# Patient Record
Sex: MALE | Race: ASIAN | NOT HISPANIC OR LATINO | URBAN - METROPOLITAN AREA
[De-identification: names, ages, dates, MRNs, and addresses within clinical notes are randomized per-mention and may not be internally consistent; named-entity substitution may affect disease eponyms.]

---

## 2021-05-25 ENCOUNTER — RECORDS - HEALTHEAST (OUTPATIENT)
Dept: ADMINISTRATIVE | Facility: CLINIC | Age: 21
End: 2021-05-25

## 2021-07-05 ENCOUNTER — COMMUNICATION - HEALTHEAST (OUTPATIENT)
Dept: FAMILY MEDICINE | Facility: CLINIC | Age: 21
End: 2021-07-05

## 2021-07-22 NOTE — LETTER
Letter by Noa Garcia DO at      Author: Noa Garcia DO Service: -- Author Type: --    Filed:  Encounter Date: 2021 Status: (Other)         Stephy PINEDO Nell  [address]  [place] [address]      2021      Dear Stephy Camejo,   : 2000      This letter is in regards to the appointment that you had scheduled on 2021 at the [facility] with Dr. Garcia.     The [facility] strives to see all patients in a timely manner and we need your help to achieve this.  The above-mentioned appointment was missed and we do not have record of a cancellation by you.  Whenever possible, we request appointment cancellations at least 72 hours in advance.  This time allows us to offer the appointment to another patient in need.      If you feel you have received this letter in error, or if you need to reschedule this appointment, please call our office so that we may update our records.      Sincerely,    [facility]

## 2025-02-23 ENCOUNTER — OFFICE VISIT (OUTPATIENT)
Dept: URGENT CARE | Facility: URGENT CARE | Age: 25
End: 2025-02-23

## 2025-02-23 VITALS
SYSTOLIC BLOOD PRESSURE: 127 MMHG | OXYGEN SATURATION: 97 % | HEART RATE: 67 BPM | WEIGHT: 180 LBS | TEMPERATURE: 98.2 F | RESPIRATION RATE: 16 BRPM | DIASTOLIC BLOOD PRESSURE: 87 MMHG

## 2025-02-23 DIAGNOSIS — M10.9 ACUTE GOUT INVOLVING TOE OF RIGHT FOOT, UNSPECIFIED CAUSE: Primary | ICD-10-CM

## 2025-02-23 PROCEDURE — 99203 OFFICE O/P NEW LOW 30 MIN: CPT | Performed by: FAMILY MEDICINE

## 2025-02-23 RX ORDER — PREDNISONE 20 MG/1
TABLET ORAL
Qty: 20 TABLET | Refills: 0 | Status: SHIPPED | OUTPATIENT
Start: 2025-02-23

## 2025-02-23 ASSESSMENT — PAIN SCALES - GENERAL: PAINLEVEL_OUTOF10: MODERATE PAIN (6)

## 2025-02-23 NOTE — PROGRESS NOTES
Assessment:       Acute gout involving toe of right foot, unspecified cause  - predniSONE (DELTASONE) 20 MG tablet  Dispense: 20 tablet; Refill: 0         Plan:     With symptoms consistent with an acute gout flare.  Discussed with him dietary restrictions, specifically cutting back on beer intake.  Prescription given for burst and taper of prednisone.  Given that this seems to be happening to him every other month or so recommend he follow-up with his PCP to discuss preventive therapy with allopurinol.  Patient is agreeable with this plan.  MEDICATIONS:   Orders Placed This Encounter   Medications    predniSONE (DELTASONE) 20 MG tablet     Sig: Take 3 tabs by mouth daily x 3 days, then 2 tabs daily x 3 days, then 1 tab daily x 3 days, then 1/2 tab daily x 3 days.     Dispense:  20 tablet     Refill:  0       Subjective:       24 year old male with a history of gout presents for evaluation of a several day history of pain swelling and redness at the base of his right great toe.  Denies any injury.  There has been no break in the skin or areas of drainage.  Symptoms feel consistent with previous episodes of gout that he has had.  He tends to drink beer fairly heavily and we discussed evidently reducing intake of alcohol or eliminating it altogether.  No fevers or chills.  He has tried taking some naproxen for his symptoms without significant relief.    There is no problem list on file for this patient.      No past medical history on file.    No past surgical history on file.    Current Outpatient Medications   Medication Sig Dispense Refill    predniSONE (DELTASONE) 20 MG tablet Take 3 tabs by mouth daily x 3 days, then 2 tabs daily x 3 days, then 1 tab daily x 3 days, then 1/2 tab daily x 3 days. 20 tablet 0     No current facility-administered medications for this visit.       Allergies   Allergen Reactions    Naproxen Itching and Rash     Other Reaction(s): Moderate pain (finding), Itching (finding), Eruption of  skin (disorder)       No family history on file.    Social History     Socioeconomic History    Marital status: Single     Spouse name: None    Number of children: None    Years of education: None    Highest education level: None   Tobacco Use    Smoking status: Never    Smokeless tobacco: Never         Review of Systems  Pertinent items are noted in HPI.      Objective:     /87 (BP Location: Right arm, Patient Position: Sitting, Cuff Size: Adult Large)   Pulse 67   Temp 98.2  F (36.8  C) (Oral)   Resp 16   Wt 81.6 kg (180 lb)   SpO2 97%      General appearance: alert, appears stated age, and cooperative  Extremities: Patient with tenderness, swelling, and mild erythema at the base of his right great toe.  No other foot swelling or ankle swelling noted.  No deformity.  There is no break in the skin or drainage.        This note has been dictated using voice recognition software. Any grammatical or context distortions are unintentional and inherent to the software

## 2025-02-23 NOTE — LETTER
February 23, 2025      Stephy Camejo  [address]  [place] [address]        To Whom It May Concern:    Stephy Camejo  was seen on 2/23/2025.  Please excuse him 2/23/2025 and 2/24/2025 due to illness.        Sincerely,        Ivett Wagner MD    Electronically signed

## 2025-05-01 ENCOUNTER — OFFICE VISIT (OUTPATIENT)
Dept: FAMILY MEDICINE | Facility: CLINIC | Age: 25
End: 2025-05-01

## 2025-05-01 VITALS
HEIGHT: 65 IN | RESPIRATION RATE: 21 BRPM | TEMPERATURE: 97.7 F | HEART RATE: 60 BPM | DIASTOLIC BLOOD PRESSURE: 62 MMHG | SYSTOLIC BLOOD PRESSURE: 116 MMHG | BODY MASS INDEX: 30.41 KG/M2 | OXYGEN SATURATION: 98 % | WEIGHT: 182.5 LBS

## 2025-05-01 DIAGNOSIS — M1A.9XX0 CHRONIC GOUT WITHOUT TOPHUS, UNSPECIFIED CAUSE, UNSPECIFIED SITE: Primary | ICD-10-CM

## 2025-05-01 DIAGNOSIS — N18.2 CKD (CHRONIC KIDNEY DISEASE) STAGE 2, GFR 60-89 ML/MIN: ICD-10-CM

## 2025-05-01 PROCEDURE — 36415 COLL VENOUS BLD VENIPUNCTURE: CPT | Performed by: FAMILY MEDICINE

## 2025-05-01 PROCEDURE — 99214 OFFICE O/P EST MOD 30 MIN: CPT | Performed by: FAMILY MEDICINE

## 2025-05-01 PROCEDURE — G2211 COMPLEX E/M VISIT ADD ON: HCPCS | Performed by: FAMILY MEDICINE

## 2025-05-01 PROCEDURE — 84550 ASSAY OF BLOOD/URIC ACID: CPT | Performed by: FAMILY MEDICINE

## 2025-05-01 PROCEDURE — 80048 BASIC METABOLIC PNL TOTAL CA: CPT | Performed by: FAMILY MEDICINE

## 2025-05-01 RX ORDER — ALLOPURINOL 100 MG/1
100 TABLET ORAL DAILY
Qty: 30 TABLET | Refills: 2 | Status: SHIPPED | OUTPATIENT
Start: 2025-05-01

## 2025-05-01 NOTE — PROGRESS NOTES
Prior to immunization administration, verified patients identity using patient s name and date of birth. Please see Immunization Activity for additional information.     Screening Questionnaire for Adult Immunization    Are you sick today?   No   Do you have allergies to medications, food, a vaccine component or latex?   Yes   Have you ever had a serious reaction after receiving a vaccination?   No   Do you have a long-term health problem with heart, lung, kidney, or metabolic disease (e.g., diabetes), asthma, a blood disorder, no spleen, complement component deficiency, a cochlear implant, or a spinal fluid leak?  Are you on long-term aspirin therapy?   No   Do you have cancer, leukemia, HIV/AIDS, or any other immune system problem?   No   Do you have a parent, brother, or sister with an immune system problem?   No   In the past 3 months, have you taken medications that affect  your immune system, such as prednisone, other steroids, or anticancer drugs; drugs for the treatment of rheumatoid arthritis, Crohn s disease, or psoriasis; or have you had radiation treatments?   Yes   Have you had a seizure, or a brain or other nervous system problem?   No   During the past year, have you received a transfusion of blood or blood    products, or been given immune (gamma) globulin or antiviral drug?   No   For women: Are you pregnant or is there a chance you could become       pregnant during the next month?   No   Have you received any vaccinations in the past 4 weeks?   No     Immunization questionnaire was positive for at least one answer.  Notified .      Patient instructed to remain in clinic for 15 minutes afterwards, and to report any adverse reactions.     Screening performed by Samm Graham MA on 5/1/2025 at 11:48 AM.

## 2025-05-01 NOTE — PROGRESS NOTES
"  {PROVIDER CHARTING PREFERENCE:982195}    Chun Keyes is a 24 year old, presenting for the following health issues:  Medication Request      5/1/2025    11:45 AM   Additional Questions   Roomed by sarah   Accompanied by michelle  wife     History of Present Illness       Reason for visit:  Gout medication    He eats 2-3 servings of fruits and vegetables daily.He consumes 1 sweetened beverage(s) daily.He exercises with enough effort to increase his heart rate 60 or more minutes per day.  He exercises with enough effort to increase his heart rate 5 days per week. He is missing 1 dose(s) of medications per week.  He is not taking prescribed medications regularly due to other.        {MA/LPN/RN Pre-Provider Visit Orders- hCG/UA/Strep (Optional):360644}  {SUPERLIST (Optional):958180}  {additonal problems for provider to add (Optional):241846}    {ROS Picklists (Optional):004424}      Objective    /62 (BP Location: Right arm, Patient Position: Sitting, Cuff Size: Adult Regular)   Pulse 60   Temp 97.7  F (36.5  C) (Temporal)   Resp 21   Ht 1.649 m (5' 4.92\")   Wt 82.8 kg (182 lb 8 oz)   SpO2 98%   BMI 30.44 kg/m    Body mass index is 30.44 kg/m .  Physical Exam   {Exam List (Optional):795131}    {Diagnostic Test Results (Optional):545538}        Signed Electronically by: Dinh Ruiz MD  {Email feedback regarding this note to [email or web address]   :627550}  " "this. He has had a few flares per year and was supposed to be on a medicine for this. He did not take and does not see anyone regularly. He does have an upcoming visit in June, was just wondering if he should start the medicine now. No current pain, swelling or redness. Last flare was a few months ago.     Past Medical History:   Diagnosis Date    Gout      History reviewed. No pertinent surgical history.  Family History   Problem Relation Age of Onset    Hypertension Mother     Diabetes Mother     Gout Mother     Gout Father     Liver Disease Father     Gout Brother     Liver Disease Brother      Social History     Socioeconomic History    Marital status: Single     Spouse name: Not on file    Number of children: Not on file    Years of education: Not on file    Highest education level: Not on file   Occupational History    Not on file   Tobacco Use    Smoking status: Never    Smokeless tobacco: Never   Vaping Use    Vaping status: Never Used   Substance and Sexual Activity    Alcohol use: Not Currently    Drug use: Never    Sexual activity: Yes     Partners: Female   Other Topics Concern    Not on file   Social History Narrative    Not on file     Social Drivers of Health     Financial Resource Strain: Not on file   Food Insecurity: Not on file   Transportation Needs: Not on file   Physical Activity: Not on file   Stress: Not on file   Social Connections: Not on file   Interpersonal Safety: Not on file   Housing Stability: Not on file       Objective    /62 (BP Location: Right arm, Patient Position: Sitting, Cuff Size: Adult Regular)   Pulse 60   Temp 97.7  F (36.5  C) (Temporal)   Resp 21   Ht 1.649 m (5' 4.92\")   Wt 82.8 kg (182 lb 8 oz)   SpO2 98%   BMI 30.44 kg/m    Body mass index is 30.44 kg/m .  Physical Exam   GENERAL: alert and no distress  EYES: Eyes grossly normal to inspection, PERRL and conjunctivae and sclerae normal  HENT: ear canals and TM's normal, nose and mouth without ulcers or " lesions  NECK: no adenopathy, no asymmetry, masses, or scars  RESP: lungs clear to auscultation - no rales, rhonchi or wheezes  CV: regular rate and rhythm, normal S1 S2, no S3 or S4, no murmur, click or rub, no peripheral edema  MS: no gross musculoskeletal defects noted, no edema  SKIN: no suspicious lesions or rashes  NEURO: Normal strength and tone, mentation intact and speech normal  PSYCH: mentation appears normal, affect normal/bright    Results for orders placed or performed in visit on 05/01/25   Uric acid     Status: Abnormal   Result Value Ref Range    Uric Acid 9.1 (H) 3.4 - 7.0 mg/dL   Basic metabolic panel  (Ca, Cl, CO2, Creat, Gluc, K, Na, BUN)     Status: Abnormal   Result Value Ref Range    Sodium 141 135 - 145 mmol/L    Potassium 4.1 3.4 - 5.3 mmol/L    Chloride 104 98 - 107 mmol/L    Carbon Dioxide (CO2) 27 22 - 29 mmol/L    Anion Gap 10 7 - 15 mmol/L    Urea Nitrogen 14.2 6.0 - 20.0 mg/dL    Creatinine 1.30 (H) 0.67 - 1.17 mg/dL    GFR Estimate 79 >60 mL/min/1.73m2    Calcium 9.3 8.8 - 10.4 mg/dL    Glucose 85 70 - 99 mg/dL           Signed Electronically by: Dinh Ruiz MD

## 2025-05-02 LAB
ANION GAP SERPL CALCULATED.3IONS-SCNC: 10 MMOL/L (ref 7–15)
BUN SERPL-MCNC: 14.2 MG/DL (ref 6–20)
CALCIUM SERPL-MCNC: 9.3 MG/DL (ref 8.8–10.4)
CHLORIDE SERPL-SCNC: 104 MMOL/L (ref 98–107)
CREAT SERPL-MCNC: 1.3 MG/DL (ref 0.67–1.17)
EGFRCR SERPLBLD CKD-EPI 2021: 79 ML/MIN/1.73M2
GLUCOSE SERPL-MCNC: 85 MG/DL (ref 70–99)
HCO3 SERPL-SCNC: 27 MMOL/L (ref 22–29)
POTASSIUM SERPL-SCNC: 4.1 MMOL/L (ref 3.4–5.3)
SODIUM SERPL-SCNC: 141 MMOL/L (ref 135–145)
URATE SERPL-MCNC: 9.1 MG/DL (ref 3.4–7)

## 2025-05-05 ENCOUNTER — PATIENT OUTREACH (OUTPATIENT)
Dept: CARE COORDINATION | Facility: CLINIC | Age: 25
End: 2025-05-05

## 2025-05-27 DIAGNOSIS — M1A.9XX0 CHRONIC GOUT WITHOUT TOPHUS, UNSPECIFIED CAUSE, UNSPECIFIED SITE: ICD-10-CM

## 2025-05-27 RX ORDER — ALLOPURINOL 100 MG/1
100 TABLET ORAL DAILY
Qty: 30 TABLET | Refills: 2 | OUTPATIENT
Start: 2025-05-27

## 2025-05-27 NOTE — TELEPHONE ENCOUNTER
Medication Question or Refill        What medication are you calling about (include dose and sig)?: allopurinol (ZYLOPRIM) 100 MG tablet     Preferred Pharmacy:   [facility]/pharmacy #[id number] [place] [address] [address]  Phone: [phone] Fax: [phone]      Controlled Substance Agreement on file:   CSA -- Patient Level:    CSA: None found at the patient level.       Who prescribed the medication?:     Do you need a refill? Yes    When did you use the medication last? N/A

## 2025-06-08 ENCOUNTER — HEALTH MAINTENANCE LETTER (OUTPATIENT)
Age: 25
End: 2025-06-08

## 2025-07-03 ENCOUNTER — HOSPITAL ENCOUNTER (OUTPATIENT)
Facility: HOSPITAL | Age: 25
Setting detail: OBSERVATION
End: 2025-07-03
Attending: EMERGENCY MEDICINE
Payer: COMMERCIAL

## 2025-07-03 ENCOUNTER — APPOINTMENT (OUTPATIENT)
Dept: CT IMAGING | Facility: HOSPITAL | Age: 25
End: 2025-07-03
Attending: EMERGENCY MEDICINE
Payer: COMMERCIAL

## 2025-07-03 VITALS
TEMPERATURE: 98.4 F | HEART RATE: 52 BPM | OXYGEN SATURATION: 98 % | HEIGHT: 66 IN | DIASTOLIC BLOOD PRESSURE: 68 MMHG | RESPIRATION RATE: 16 BRPM | BODY MASS INDEX: 28.17 KG/M2 | WEIGHT: 175.27 LBS | SYSTOLIC BLOOD PRESSURE: 126 MMHG

## 2025-07-03 DIAGNOSIS — S30.1XXA HEMATOMA OF RECTUS SHEATH, INITIAL ENCOUNTER: ICD-10-CM

## 2025-07-03 LAB
ALBUMIN SERPL BCG-MCNC: 4.7 G/DL (ref 3.5–5.2)
ALBUMIN UR-MCNC: NEGATIVE MG/DL
ALP SERPL-CCNC: 71 U/L (ref 40–150)
ALT SERPL W P-5'-P-CCNC: 33 U/L (ref 0–70)
ANION GAP SERPL CALCULATED.3IONS-SCNC: 12 MMOL/L (ref 7–15)
ANION GAP SERPL CALCULATED.3IONS-SCNC: 8 MMOL/L (ref 7–15)
APPEARANCE UR: CLEAR
APTT PPP: 29 SECONDS (ref 22–38)
AST SERPL W P-5'-P-CCNC: 25 U/L (ref 0–45)
BILIRUB DIRECT SERPL-MCNC: 0.15 MG/DL (ref 0–0.3)
BILIRUB SERPL-MCNC: 0.4 MG/DL
BILIRUB UR QL STRIP: NEGATIVE
BUN SERPL-MCNC: 14 MG/DL (ref 6–20)
BUN SERPL-MCNC: 14.5 MG/DL (ref 6–20)
CALCIUM SERPL-MCNC: 8.7 MG/DL (ref 8.8–10.4)
CALCIUM SERPL-MCNC: 9.3 MG/DL (ref 8.8–10.4)
CHLORIDE SERPL-SCNC: 103 MMOL/L (ref 98–107)
CHLORIDE SERPL-SCNC: 107 MMOL/L (ref 98–107)
CK SERPL-CCNC: 501 U/L (ref 39–308)
COLOR UR AUTO: ABNORMAL
CREAT SERPL-MCNC: 1.39 MG/DL (ref 0.67–1.17)
CREAT SERPL-MCNC: 1.41 MG/DL (ref 0.67–1.17)
EGFRCR SERPLBLD CKD-EPI 2021: 71 ML/MIN/1.73M2
EGFRCR SERPLBLD CKD-EPI 2021: 73 ML/MIN/1.73M2
ERYTHROCYTE [DISTWIDTH] IN BLOOD BY AUTOMATED COUNT: 11.9 % (ref 10–15)
GLUCOSE SERPL-MCNC: 110 MG/DL (ref 70–99)
GLUCOSE SERPL-MCNC: 97 MG/DL (ref 70–99)
GLUCOSE UR STRIP-MCNC: NEGATIVE MG/DL
HCO3 SERPL-SCNC: 26 MMOL/L (ref 22–29)
HCO3 SERPL-SCNC: 27 MMOL/L (ref 22–29)
HCT VFR BLD AUTO: 41.4 % (ref 40–53)
HCT VFR BLD AUTO: 41.6 % (ref 40–53)
HCT VFR BLD AUTO: 42.4 % (ref 40–53)
HCT VFR BLD AUTO: 46.2 % (ref 40–53)
HGB BLD-MCNC: 14.7 G/DL (ref 13.3–17.7)
HGB BLD-MCNC: 15 G/DL (ref 13.3–17.7)
HGB BLD-MCNC: 15.4 G/DL (ref 13.3–17.7)
HGB BLD-MCNC: 16.1 G/DL (ref 13.3–17.7)
HGB UR QL STRIP: NEGATIVE
INR PPP: 1.07 (ref 0.85–1.15)
KETONES UR STRIP-MCNC: ABNORMAL MG/DL
LEUKOCYTE ESTERASE UR QL STRIP: NEGATIVE
LIPASE SERPL-CCNC: 32 U/L (ref 13–60)
MCH RBC QN AUTO: 29.2 PG (ref 26.5–33)
MCHC RBC AUTO-ENTMCNC: 34.8 G/DL (ref 31.5–36.5)
MCV RBC AUTO: 83 FL (ref 78–100)
MCV RBC AUTO: 83 FL (ref 78–100)
MCV RBC AUTO: 84 FL (ref 78–100)
MCV RBC AUTO: 85 FL (ref 78–100)
NITRATE UR QL: NEGATIVE
PH UR STRIP: 6.5 [PH] (ref 5–7)
PLATELET # BLD AUTO: 187 10E3/UL (ref 150–450)
POTASSIUM SERPL-SCNC: 3.9 MMOL/L (ref 3.4–5.3)
POTASSIUM SERPL-SCNC: 4 MMOL/L (ref 3.4–5.3)
PROT SERPL-MCNC: 7.1 G/DL (ref 6.4–8.3)
PROTHROMBIN TIME: 14.2 SECONDS (ref 11.8–14.8)
RADIOLOGIST FLAGS: ABNORMAL
RBC # BLD AUTO: 5.52 10E6/UL (ref 4.4–5.9)
RBC URINE: 1 /HPF
SODIUM SERPL-SCNC: 141 MMOL/L (ref 135–145)
SODIUM SERPL-SCNC: 142 MMOL/L (ref 135–145)
SP GR UR STRIP: 1.01 (ref 1–1.03)
UROBILINOGEN UR STRIP-MCNC: NORMAL MG/DL
WBC # BLD AUTO: 9.9 10E3/UL (ref 4–11)
WBC URINE: 1 /HPF

## 2025-07-03 PROCEDURE — 82248 BILIRUBIN DIRECT: CPT | Performed by: EMERGENCY MEDICINE

## 2025-07-03 PROCEDURE — 85018 HEMOGLOBIN: CPT | Performed by: EMERGENCY MEDICINE

## 2025-07-03 PROCEDURE — 83690 ASSAY OF LIPASE: CPT | Performed by: EMERGENCY MEDICINE

## 2025-07-03 PROCEDURE — 99207 PR NO CHARGE LOS: CPT | Performed by: HOSPITALIST

## 2025-07-03 PROCEDURE — 250N000011 HC RX IP 250 OP 636: Performed by: EMERGENCY MEDICINE

## 2025-07-03 PROCEDURE — 85730 THROMBOPLASTIN TIME PARTIAL: CPT | Performed by: HOSPITALIST

## 2025-07-03 PROCEDURE — 250N000013 HC RX MED GY IP 250 OP 250 PS 637: Performed by: HOSPITALIST

## 2025-07-03 PROCEDURE — 96374 THER/PROPH/DIAG INJ IV PUSH: CPT

## 2025-07-03 PROCEDURE — 96375 TX/PRO/DX INJ NEW DRUG ADDON: CPT

## 2025-07-03 PROCEDURE — 80048 BASIC METABOLIC PNL TOTAL CA: CPT | Performed by: EMERGENCY MEDICINE

## 2025-07-03 PROCEDURE — 82550 ASSAY OF CK (CPK): CPT | Performed by: HOSPITALIST

## 2025-07-03 PROCEDURE — G0378 HOSPITAL OBSERVATION PER HR: HCPCS

## 2025-07-03 PROCEDURE — 85018 HEMOGLOBIN: CPT | Performed by: HOSPITALIST

## 2025-07-03 PROCEDURE — 96361 HYDRATE IV INFUSION ADD-ON: CPT

## 2025-07-03 PROCEDURE — 81001 URINALYSIS AUTO W/SCOPE: CPT | Performed by: EMERGENCY MEDICINE

## 2025-07-03 PROCEDURE — 84450 TRANSFERASE (AST) (SGOT): CPT | Performed by: EMERGENCY MEDICINE

## 2025-07-03 PROCEDURE — 36415 COLL VENOUS BLD VENIPUNCTURE: CPT | Performed by: EMERGENCY MEDICINE

## 2025-07-03 PROCEDURE — 99285 EMERGENCY DEPT VISIT HI MDM: CPT | Mod: 25

## 2025-07-03 PROCEDURE — 258N000003 HC RX IP 258 OP 636: Performed by: EMERGENCY MEDICINE

## 2025-07-03 PROCEDURE — 74177 CT ABD & PELVIS W/CONTRAST: CPT

## 2025-07-03 PROCEDURE — 99221 1ST HOSP IP/OBS SF/LOW 40: CPT | Performed by: HOSPITALIST

## 2025-07-03 PROCEDURE — 85610 PROTHROMBIN TIME: CPT | Performed by: HOSPITALIST

## 2025-07-03 PROCEDURE — 85014 HEMATOCRIT: CPT | Performed by: EMERGENCY MEDICINE

## 2025-07-03 PROCEDURE — 36415 COLL VENOUS BLD VENIPUNCTURE: CPT | Performed by: HOSPITALIST

## 2025-07-03 RX ORDER — AMOXICILLIN 250 MG
2 CAPSULE ORAL 2 TIMES DAILY PRN
Status: ACTIVE | OUTPATIENT
Start: 2025-07-03

## 2025-07-03 RX ORDER — ACETAMINOPHEN 650 MG/1
650 SUPPOSITORY RECTAL EVERY 4 HOURS PRN
Status: ACTIVE | OUTPATIENT
Start: 2025-07-03

## 2025-07-03 RX ORDER — ACETAMINOPHEN 325 MG/1
650 TABLET ORAL EVERY 4 HOURS PRN
Status: DISPENSED | OUTPATIENT
Start: 2025-07-03

## 2025-07-03 RX ORDER — ONDANSETRON 2 MG/ML
4 INJECTION INTRAMUSCULAR; INTRAVENOUS ONCE
Status: COMPLETED | OUTPATIENT
Start: 2025-07-03 | End: 2025-07-03

## 2025-07-03 RX ORDER — IOPAMIDOL 755 MG/ML
90 INJECTION, SOLUTION INTRAVASCULAR ONCE
Status: COMPLETED | OUTPATIENT
Start: 2025-07-03 | End: 2025-07-03

## 2025-07-03 RX ORDER — MORPHINE SULFATE 4 MG/ML
4 INJECTION, SOLUTION INTRAMUSCULAR; INTRAVENOUS ONCE
Refills: 0 | Status: COMPLETED | OUTPATIENT
Start: 2025-07-03 | End: 2025-07-03

## 2025-07-03 RX ORDER — POLYETHYLENE GLYCOL 3350 17 G/17G
17 POWDER, FOR SOLUTION ORAL 2 TIMES DAILY PRN
Status: ACTIVE | OUTPATIENT
Start: 2025-07-03

## 2025-07-03 RX ORDER — OXYCODONE HYDROCHLORIDE 5 MG/1
5 TABLET ORAL EVERY 4 HOURS PRN
Refills: 0 | Status: ACTIVE | OUTPATIENT
Start: 2025-07-03

## 2025-07-03 RX ORDER — NALOXONE HYDROCHLORIDE 0.4 MG/ML
0.2 INJECTION, SOLUTION INTRAMUSCULAR; INTRAVENOUS; SUBCUTANEOUS
Status: ACTIVE | OUTPATIENT
Start: 2025-07-03

## 2025-07-03 RX ORDER — NALOXONE HYDROCHLORIDE 0.4 MG/ML
0.4 INJECTION, SOLUTION INTRAMUSCULAR; INTRAVENOUS; SUBCUTANEOUS
Status: ACTIVE | OUTPATIENT
Start: 2025-07-03

## 2025-07-03 RX ORDER — AMOXICILLIN 250 MG
1 CAPSULE ORAL 2 TIMES DAILY PRN
Status: ACTIVE | OUTPATIENT
Start: 2025-07-03

## 2025-07-03 RX ORDER — KETOROLAC TROMETHAMINE 15 MG/ML
15 INJECTION, SOLUTION INTRAMUSCULAR; INTRAVENOUS ONCE
Status: COMPLETED | OUTPATIENT
Start: 2025-07-03 | End: 2025-07-03

## 2025-07-03 RX ORDER — ALLOPURINOL 100 MG/1
100 TABLET ORAL DAILY
Status: DISCONTINUED | OUTPATIENT
Start: 2025-07-03 | End: 2025-07-03

## 2025-07-03 RX ORDER — ONDANSETRON 2 MG/ML
4 INJECTION INTRAMUSCULAR; INTRAVENOUS EVERY 6 HOURS PRN
Status: ACTIVE | OUTPATIENT
Start: 2025-07-03

## 2025-07-03 RX ORDER — PROCHLORPERAZINE MALEATE 10 MG
10 TABLET ORAL EVERY 6 HOURS PRN
Status: ACTIVE | OUTPATIENT
Start: 2025-07-03

## 2025-07-03 RX ORDER — ALLOPURINOL 300 MG/1
300 TABLET ORAL DAILY
Status: ACTIVE | OUTPATIENT
Start: 2025-07-04

## 2025-07-03 RX ORDER — ONDANSETRON 4 MG/1
4 TABLET, ORALLY DISINTEGRATING ORAL EVERY 6 HOURS PRN
Status: ACTIVE | OUTPATIENT
Start: 2025-07-03

## 2025-07-03 RX ORDER — ALLOPURINOL 100 MG/1
200 TABLET ORAL ONCE
Status: COMPLETED | OUTPATIENT
Start: 2025-07-03 | End: 2025-07-03

## 2025-07-03 RX ADMIN — MORPHINE SULFATE 4 MG: 4 INJECTION, SOLUTION INTRAMUSCULAR; INTRAVENOUS at 01:42

## 2025-07-03 RX ADMIN — ONDANSETRON 4 MG: 2 INJECTION, SOLUTION INTRAMUSCULAR; INTRAVENOUS at 01:41

## 2025-07-03 RX ADMIN — IOPAMIDOL 90 ML: 755 INJECTION, SOLUTION INTRAVENOUS at 02:56

## 2025-07-03 RX ADMIN — ALLOPURINOL 200 MG: 100 TABLET ORAL at 12:55

## 2025-07-03 RX ADMIN — SODIUM CHLORIDE 1000 ML: 0.9 INJECTION, SOLUTION INTRAVENOUS at 01:48

## 2025-07-03 RX ADMIN — ALLOPURINOL 100 MG: 100 TABLET ORAL at 10:31

## 2025-07-03 RX ADMIN — KETOROLAC TROMETHAMINE 15 MG: 15 INJECTION, SOLUTION INTRAMUSCULAR; INTRAVENOUS at 02:03

## 2025-07-03 ASSESSMENT — ACTIVITIES OF DAILY LIVING (ADL)
ADLS_ACUITY_SCORE: 41
ADLS_ACUITY_SCORE: 41
ADLS_ACUITY_SCORE: 24
ADLS_ACUITY_SCORE: 41
ADLS_ACUITY_SCORE: 41
ADLS_ACUITY_SCORE: 24
ADLS_ACUITY_SCORE: 41
ADLS_ACUITY_SCORE: 24
ADLS_ACUITY_SCORE: 41
ADLS_ACUITY_SCORE: 24
ADLS_ACUITY_SCORE: 24
ADLS_ACUITY_SCORE: 41
ADLS_ACUITY_SCORE: 41
ADLS_ACUITY_SCORE: 24
ADLS_ACUITY_SCORE: 24
ADLS_ACUITY_SCORE: 15
ADLS_ACUITY_SCORE: 41

## 2025-07-03 NOTE — ED NOTES
[facility]  Expected date:   Expected time:   Means of arrival:   Comments:  ED#4

## 2025-07-03 NOTE — PLAN OF CARE
Goal Outcome Evaluation:       Pt to transfer to short stay observation unit room 8. Report given to short stay obs RN.

## 2025-07-03 NOTE — PHARMACY-ADMISSION MEDICATION HISTORY
Pharmacist Admission Medication History    Admission medication history is complete. The information provided in this note is only as accurate as the sources available at the time of the update.    Information Source(s): Patient, Clinic records, and CareEverywhere/SureScripts via in-person    Pertinent Information: none    Changes made to PTA medication list:  Added: None  Deleted: None  Changed: None    Allergies reviewed with patient and updates made in EHR: yes    Medication History Completed By: Jac Glasgow RPH 7/3/2025 7:16 AM    PTA Med List   Medication Sig Last Dose/Taking    allopurinol (ZYLOPRIM) 100 MG tablet Take 1 tablet (100 mg) by mouth daily. 7/2/2025 Morning

## 2025-07-03 NOTE — H&P
[facility]    History and Physical - Hospitalist Service       Date of Admission:  7/3/2025    Assessment & Plan      Stephy Camejo is a 24 year old male admitted on 7/3/2025. He came to the ED for evaluation of severe left lower quadrant abdominal pain    #Acute left lower rectus sheath hematoma  - CT showed trace extraperitoneal blood products along the inner aspect of the lower abdominal wall; active bleeding is present within the muscle  - Discussed with ED provider: IR recommended hemoglobin check every 6 hours, no emergent intervention unless significant hemoglobin drop  - Supportive cares: Pain management    #Acute versus chronic kidney disease stage II  - Patient lift weights 4 times per week on average and is otherwise active  - Calculated BMI 29.05  - Creatinine 1.3 on 5/1/2025 when evaluated for acute gout  - Pending UA to evaluate for proteinuria  - Check CK level  - Avoid nephrotoxins including NSAIDs  - Outpatient follow-up with primary provider     Observation Goals: -diagnostic tests and consults completed and resulted, -vital signs normal or at patient baseline, Nurse to notify provider when observation goals have been met and patient is ready for discharge.  Diet: Regular Diet Adult    DVT Prophylaxis: Ambulate every shift  Borges Catheter: Not present  Lines: None     Cardiac Monitoring: None  Code Status: Full Code          Disposition Plan     Medically Ready for Discharge: Anticipated Tomorrow           Kenton Cadena MD  Hospitalist Service  [facility]  Securely message with [employer] (more info)  Text page via [employer] Paging/Directory     ______________________________________________________________________    Chief Complaint   Left lower quadrant abdominal pain    History is obtained from the patient, electronic health record, emergency department physician, and patient's spouse    History of Present Illness   Stephy Camejo is a 24 year old male  who came to the ED for evaluation of severe left lower quadrant abdominal pain.  Past medical history of gout.  Patient felt left-sided abdominal discomfort after playing volleyball on 6/27/2025.  He went to play again on 7/2/2025 and felt like he reinjured.  While at home, he developed excruciating left lower quadrant pain.  In the ED, he was hemodynamically stable but CTA showed left lower rectus sheath hematoma with active bleeding.  Pain is better controlled after IV morphine and ketorolac.  He denies tobacco or drugs and drinks alcohol socially.  His father passed from liver and kidney disease as a consequence of alcoholism.      Past Medical History    Past Medical History:   Diagnosis Date    Gout        Past Surgical History   History reviewed. No pertinent surgical history.    Prior to Admission Medications   Prior to Admission Medications   Prescriptions Last Dose Informant Patient Reported? Taking?   allopurinol (ZYLOPRIM) 100 MG tablet   No No   Sig: Take 1 tablet (100 mg) by mouth daily.      Facility-Administered Medications: None           Physical Exam   Vital Signs: Temp: 98.1  F (36.7  C) Temp src: Oral BP: 108/52 Pulse: 55   Resp: 20 SpO2: 96 % O2 Device: None (Room air)    Weight: 180 lbs 0 oz    Constitutional: no apparent distress  Respiratory: no increased work of breathing  Cardiovascular: regular rate and rhythm  GI: normal bowel sounds and tenderness noted in the left lower quadrant  Musculoskeletal: no lower extremity pitting edema present    Medical Decision Making       45 MINUTES SPENT BY ME on the date of service doing chart review, history, exam, documentation & further activities per the note.  MANAGEMENT DISCUSSED with the following over the past 24 hours: ED provider       Data     I have personally reviewed the following data over the past 24 hrs:    9.9  \   16.1   / 187     141 103 14.0 /  110 (H)   3.9 26 1.41 (H) \     ALT: 33 AST: 25 AP: 71 TBILI: 0.4   ALB: 4.7 TOT PROTEIN:  7.1 LIPASE: 32       Imaging results reviewed over the past 24 hrs:   Recent Results (from the past 24 hours)   CT Abdomen Pelvis w Contrast   Result Value    Radiologist flags Active extravasation from a vessel (AA)    Narrative    EXAM: CT ABDOMEN PELVIS W CONTRAST  LOCATION: [facility]  DATE: 7/3/2025    INDICATION: Lower abdominal pain.  COMPARISON: None.  TECHNIQUE: CT scan of the abdomen and pelvis was performed following injection of IV contrast. Multiplanar reformats were obtained. Dose reduction techniques were used.  CONTRAST: 90 ml Isovue 370.    FINDINGS:   LOWER CHEST: Normal.    HEPATOBILIARY: Normal.    PANCREAS: Normal.    SPLEEN: Normal.    ADRENAL GLANDS: Normal.    KIDNEYS/BLADDER: Normal.    BOWEL: Normal.    LYMPH NODES: Normal.    PELVIC ORGANS: Normal.    MUSCULOSKELETAL: Left lower rectus abdominis intramuscular hematoma with active internal bleeding. The involved segment of muscle measures roughly 8 x 5 x 4 cm, although this volume will represent a combination of both hematoma and muscle. Trace   extraperitoneal blood extension into the inner aspect of the lower abdominal wall. No hemoperitoneum.      Impression    IMPRESSION:   1.  Acute left - lower rectus sheath hematoma with trace extraperitoneal blood products along the inner aspect of the lower abdominal wall. The total size measures roughly 8 x 5 x 4 cm (however, this will include a portion of muscle and is not   exclusively the volume of blood).  2.  Active bleeding is present within the muscle at the time of imaging.      [Critical Result: Active extravasation from a vessel     Finding was identified on 7/3/2025 3:03 AM CDT.     Dr. Post was contacted by me on 7/3/2025 3:12 AM CDT and verbalized understanding of the critical result.

## 2025-07-03 NOTE — ED PROVIDER NOTES
"EMERGENCY DEPARTMENT ENCOUnter      NAME: Stephy Camejo  AGE: 24 year old male  YOB: 2000  MRN: [id number]  EVALUATION DATE & TIME: 7/3/2025 12:45 AM    PCP: Dinh Ruiz    ED PROVIDER: Sathya Post DO      Chief Complaint   Patient presents with    Abdominal Pain         FINAL IMPRESSION:  No diagnosis found.      ED COURSE & MEDICAL DECISION MAKIN:26 AM I met with the patient to obtain patient history and performed a physical exam. Discussed plan for ED work up including potential diagnostic studies and interventions.  3:04 AM Spoke with radiology regarding CT scan of abdomen.  4:29 AM Spoke with Dr. Middleton, IR.  4:33 AM Reassessed and updated patient with findings.  4:58 AM Spoke with Dr. Cadena, hospitalist, regarding admission.      Medical Decision Making  {DID YOU REMEMBER TO DOCUMENT...?:881711}  {ADMIT VS D/C:836247}    MIPS (CTPE, Dental pain, Borges, Sinusitis, Asthma/COPD, Head Trauma): {ECC MIPS DOCUMENTATION:346495}    SEPSIS: {Sepsis/Stemi/Stroke:705702::\"None\"}      At the conclusion of the encounter I discussed the results of all of the tests and the disposition. The questions were answered. The patient or family acknowledged understanding and was agreeable with the care plan.         MEDICATIONS GIVEN IN THE EMERGENCY:  Medications - No data to display    NEW PRESCRIPTIONS STARTED AT TODAY'S ER VISIT  New Prescriptions    No medications on file          =================================================================    HPI        Stephy Camejo is a 24 year old male with no documented pertinent history who presents to this ED via private car with  for evaluation of abdominal pain.    Patient reports noticing abdominal tightness last weekend. After playing volleyball yesterday, he developed left lower abdominal pain that worsens with movement standing straight. Patient endorses pressure in his left lower abdomen that radiates down into his testicles. Patient denies " "any abdominal surgeries and any other complaints at this time.      PAST MEDICAL HISTORY:  Past Medical History:   Diagnosis Date    Gout        PAST SURGICAL HISTORY:  No past surgical history on file.        CURRENT MEDICATIONS:    allopurinol (ZYLOPRIM) 100 MG tablet        ALLERGIES:  Allergies   Allergen Reactions    Naproxen Itching and Rash     Other Reaction(s): Moderate pain (finding), Itching (finding), Eruption of skin (disorder)       FAMILY HISTORY:  Family History   Problem Relation Age of Onset    Hypertension Mother     Diabetes Mother     Gout Mother     Gout Father     Liver Disease Father     Gout Brother     Liver Disease Brother        SOCIAL HISTORY:   Social History     Socioeconomic History    Marital status: Single   Tobacco Use    Smoking status: Never    Smokeless tobacco: Never   Vaping Use    Vaping status: Never Used   Substance and Sexual Activity    Alcohol use: Not Currently    Drug use: Never    Sexual activity: Yes     Partners: Female       VITALS:  Patient Vitals for the past 24 hrs:   BP Temp Temp src Pulse Resp SpO2 Height Weight   07/03/25 0039 105/65 98.1  F (36.7  C) Oral 63 20 99 % 1.676 m (5' 6\") 81.6 kg (180 lb)       PHYSICAL EXAM    Constitutional:  Well developed, Well nourished,  HENT:  Normocephalic, Atraumatic, Oropharynx moist, Nose normal.   Eyes:  EOMI, Conjunctiva normal, No discharge.   Respiratory:  Normal breath sounds, No respiratory distress, No wheezing, No chest tenderness.   Cardiovascular:  Normal heart rate, Normal rhythm, No murmurs  GI:  Soft, No tenderness, No guarding, No CVA tenderness.   Musculoskeletal:  No tenderness to palpation or major deformities noted.   Extremities: No lower extremity edema.  Neurologic:  Alert & oriented x 3, No focal deficits noted.   Psychiatric:  Affect normal, Judgment normal, Mood normal.        LAB:  All pertinent labs reviewed and interpreted.       RADIOLOGY:  I have independently reviewed and interpreted the " above imaging, pending the final radiology read.  No orders to display       EKG:        I have independently reviewed and interpreted this EKG          I, Imelda Degroot, am serving as a scribe to document services personally performed by Dr. Post based on my observation and the provider's statements to me. I, Sathya Post, DO attest that Imelda Degroot is acting in a scribe capacity, has observed my performance of the services and has documented them in accordance with my direction.    Sathya Post, DO  Emergency Medicine  [facility] EMERGENCY DEPARTMENT  [address] [place] [id number] [phone]  Dept: [phone]   6.0 - 20.0 mg/dL    Creatinine 1.41 (H) 0.67 - 1.17 mg/dL    GFR Estimate 71 >60 mL/min/1.73m2    Calcium 9.3 8.8 - 10.4 mg/dL    Glucose 110 (H) 70 - 99 mg/dL   Hepatic function panel   Result Value Ref Range    Protein Total 7.1 6.4 - 8.3 g/dL    Albumin 4.7 3.5 - 5.2 g/dL    Bilirubin Total 0.4 <=1.2 mg/dL    Alkaline Phosphatase 71 40 - 150 U/L    AST 25 0 - 45 U/L    ALT 33 0 - 70 U/L    Bilirubin Direct 0.15 0.00 - 0.30 mg/dL   Result Value Ref Range    Lipase 32 13 - 60 U/L   UA with Microscopic reflex to Culture    Specimen: Urine, Clean Catch   Result Value Ref Range    Color Urine Light Yellow Colorless, Straw, Light Yellow, Yellow    Appearance Urine Clear Clear    Glucose Urine Negative Negative mg/dL    Bilirubin Urine Negative Negative    Ketones Urine Trace (A) Negative mg/dL    Specific Gravity Urine 1.010 1.003 - 1.035    Blood Urine Negative Negative    pH Urine 6.5 5.0 - 7.0    Protein Albumin Urine Negative Negative mg/dL    Urobilinogen Urine Normal Normal mg/dL    Nitrite Urine Negative Negative    Leukocyte Esterase Urine Negative Negative    RBC Urine 1 <=2 /HPF    WBC Urine 1 <=5 /HPF         RADIOLOGY:  I have independently reviewed and interpreted the above imaging, pending the final radiology read.  CT Abdomen Pelvis w Contrast   Final Result   Abnormal   IMPRESSION:    1.  Acute left - lower rectus sheath hematoma with trace extraperitoneal blood products along the inner aspect of the lower abdominal wall. The total size measures roughly 8 x 5 x 4 cm (however, this will include a portion of muscle and is not    exclusively the volume of blood).   2.  Active bleeding is present within the muscle at the time of imaging.         [Critical Result: Active extravasation from a vessel       Finding was identified on 7/3/2025 3:03 AM CDT.       Dr. Post was contacted by me on 7/3/2025 3:12 AM CDT and verbalized understanding of the critical result.          I, Imelda Degroot, am serving as a  scribe to document services personally performed by Dr. Post based on my observation and the provider's statements to me. I, Sathya Post, DO attest that Imelda Degroot is acting in a scribe capacity, has observed my performance of the services and has documented them in accordance with my direction.    Sathya Post DO  Emergency Medicine  [facility] EMERGENCY DEPARTMENT  [address] [place] [id number]  [phone]  Dept: [phone]     Sathya Post DO  07/10/25 0808

## 2025-07-03 NOTE — PROGRESS NOTES
Patient admitted to room  at approximately 08 via wheel chair from emergency room.  Reason for Admission: Abd pain, Hematoma of rectus sheath  Report received from: ED RN  Patient was accompanied by Spouse.  Discharge transportation provided by: self  Patient ambulated/transferred:  independently. self.  Patient is alert and orientated x 4.  Outpatient Observation education provided to: (patient, family, friend)  MDRO Education done if applicable (MRSA, VRE, etc)  Safety risks were identified during admission:  none.   Yellow risk/fall band applied:  No  Home meds sent home: Yes  Home meds sent to pharmacy:No IF YES add 1/2 sheet laminated page reminder to chart/clipboard   Detailed Belongings:

## 2025-07-03 NOTE — PLAN OF CARE
Problem: Adult Inpatient Plan of Care  Goal: Optimal Comfort and Wellbeing  Outcome: Progressing   Goal Outcome Evaluation:       Pt declined pain medication. Allopurinol dose increased by provider after Pt reported increased stiffness in left ankle. Pt declined additional PRN pain medication offered by RN.   Wood Arboleda RN  7/3/2025

## 2025-07-03 NOTE — PROGRESS NOTES
Patient is a 24-year-old male with history of gout and CKD stage II who presented to ED this morning with severe LLQ abdominal pain and found to have acute left lower rectus sheath hematoma with active bleeding.    H&P from this morning reviewed  IR consulted and recommend obervation, trending Hgb and if significant drop, re-contact them for intervention.    Patient reports playing volleyball a couple days ago and felt a sharp pain that improved initially and then got progressively worse.     No anticoagulants or known bleeding disorders. INR, PTT normal  Patient not aware of any family CKD and no family members on dialysis.    Estimated time of discharge: Tomorrow if Hgb and renal fxn remain stable

## 2025-07-03 NOTE — ED TRIAGE NOTES
Pt arrives for eval of left lower abd pain. Pt felt like something happened while he was playing volleyball this last weekend. He was playing again today and felt the pain get worse. No recent medications.    Triage Assessment (Adult)       Row Name 07/03/25 0041          Triage Assessment    Airway WDL WDL        Respiratory WDL    Respiratory WDL WDL        Skin Circulation/Temperature WDL    Skin Circulation/Temperature WDL WDL        Cardiac WDL    Cardiac WDL WDL        Peripheral/Neurovascular WDL    Peripheral Neurovascular WDL WDL        Cognitive/Neuro/Behavioral WDL    Cognitive/Neuro/Behavioral WDL WDL

## 2025-07-04 VITALS
RESPIRATION RATE: 18 BRPM | OXYGEN SATURATION: 98 % | HEIGHT: 66 IN | TEMPERATURE: 97.9 F | WEIGHT: 175.27 LBS | DIASTOLIC BLOOD PRESSURE: 78 MMHG | HEART RATE: 58 BPM | BODY MASS INDEX: 28.17 KG/M2 | SYSTOLIC BLOOD PRESSURE: 109 MMHG

## 2025-07-04 LAB
ALBUMIN SERPL BCG-MCNC: 3.8 G/DL (ref 3.5–5.2)
ALP SERPL-CCNC: 63 U/L (ref 40–150)
ALT SERPL W P-5'-P-CCNC: 25 U/L (ref 0–70)
ANION GAP SERPL CALCULATED.3IONS-SCNC: 10 MMOL/L (ref 7–15)
AST SERPL W P-5'-P-CCNC: 20 U/L (ref 0–45)
BASOPHILS # BLD AUTO: 0 10E3/UL (ref 0–0.2)
BASOPHILS NFR BLD AUTO: 1 %
BILIRUB SERPL-MCNC: 0.3 MG/DL
BUN SERPL-MCNC: 14.9 MG/DL (ref 6–20)
CALCIUM SERPL-MCNC: 8.6 MG/DL (ref 8.8–10.4)
CHLORIDE SERPL-SCNC: 105 MMOL/L (ref 98–107)
CK SERPL-CCNC: 525 U/L (ref 39–308)
CREAT SERPL-MCNC: 1.33 MG/DL (ref 0.67–1.17)
EGFRCR SERPLBLD CKD-EPI 2021: 77 ML/MIN/1.73M2
EOSINOPHIL # BLD AUTO: 0.2 10E3/UL (ref 0–0.7)
EOSINOPHIL NFR BLD AUTO: 4 %
ERYTHROCYTE [DISTWIDTH] IN BLOOD BY AUTOMATED COUNT: 11.9 % (ref 10–15)
GLUCOSE SERPL-MCNC: 104 MG/DL (ref 70–99)
HCO3 SERPL-SCNC: 25 MMOL/L (ref 22–29)
HCT VFR BLD AUTO: 41.4 % (ref 40–53)
HGB BLD-MCNC: 14.8 G/DL (ref 13.3–17.7)
IMM GRANULOCYTES # BLD: 0 10E3/UL
IMM GRANULOCYTES NFR BLD: 0 %
LYMPHOCYTES # BLD AUTO: 1.4 10E3/UL (ref 0.8–5.3)
LYMPHOCYTES NFR BLD AUTO: 26 %
MCH RBC QN AUTO: 29.8 PG (ref 26.5–33)
MCHC RBC AUTO-ENTMCNC: 35.7 G/DL (ref 31.5–36.5)
MCV RBC AUTO: 84 FL (ref 78–100)
MONOCYTES # BLD AUTO: 0.5 10E3/UL (ref 0–1.3)
MONOCYTES NFR BLD AUTO: 9 %
NEUTROPHILS # BLD AUTO: 3.3 10E3/UL (ref 1.6–8.3)
NEUTROPHILS NFR BLD AUTO: 60 %
NRBC # BLD AUTO: 0 10E3/UL
NRBC BLD AUTO-RTO: 0 /100
PLATELET # BLD AUTO: 156 10E3/UL (ref 150–450)
POTASSIUM SERPL-SCNC: 4.1 MMOL/L (ref 3.4–5.3)
PROT SERPL-MCNC: 5.9 G/DL (ref 6.4–8.3)
RBC # BLD AUTO: 4.96 10E6/UL (ref 4.4–5.9)
SODIUM SERPL-SCNC: 140 MMOL/L (ref 135–145)
WBC # BLD AUTO: 5.5 10E3/UL (ref 4–11)

## 2025-07-04 PROCEDURE — 99207 PR APP CREDIT; MD BILLING SHARED VISIT: CPT | Performed by: INTERNAL MEDICINE

## 2025-07-04 PROCEDURE — 82374 ASSAY BLOOD CARBON DIOXIDE: CPT | Performed by: HOSPITALIST

## 2025-07-04 PROCEDURE — G0378 HOSPITAL OBSERVATION PER HR: HCPCS

## 2025-07-04 PROCEDURE — 85048 AUTOMATED LEUKOCYTE COUNT: CPT | Performed by: HOSPITALIST

## 2025-07-04 PROCEDURE — 36415 COLL VENOUS BLD VENIPUNCTURE: CPT | Performed by: HOSPITALIST

## 2025-07-04 PROCEDURE — 82550 ASSAY OF CK (CPK): CPT | Performed by: HOSPITALIST

## 2025-07-04 PROCEDURE — 250N000013 HC RX MED GY IP 250 OP 250 PS 637: Performed by: HOSPITALIST

## 2025-07-04 PROCEDURE — 99239 HOSP IP/OBS DSCHRG MGMT >30: CPT | Mod: FS

## 2025-07-04 RX ADMIN — ALLOPURINOL 300 MG: 300 TABLET ORAL at 09:03

## 2025-07-04 ASSESSMENT — ACTIVITIES OF DAILY LIVING (ADL)
ADLS_ACUITY_SCORE: 24

## 2025-07-04 NOTE — DISCHARGE SUMMARY
"[facility]  Hospitalist Discharge Summary      Date of Admission:  7/3/2025  Date of Discharge:  7/4/2025  Discharging Provider: Brandi Vallecillo NP  Discharge Service: Hospitalist Service    Discharge Diagnoses   Rectus sheath hematoma    Clinically Significant Risk Factors     # Overweight: Estimated body mass index is 28.3 kg/m  as calculated from the following:    Height as of this encounter: 1.676 m (5' 5.98\").    Weight as of this encounter: 79.5 kg (175 lb 4.3 oz).       Follow-ups Needed After Discharge   Follow-up Appointments       Hospital Follow-up with Existing Primary Care Provider (PCP)          Schedule Primary Care visit within: 7 Days   Recommended labs and Imaging (to be ordered by Primary Care Provider): Follow up hemoglobin               Unresulted Labs Ordered in the Past 30 Days of this Admission       No orders found for last 31 day(s).            Discharge Disposition   Discharged to home  Condition at discharge: Stable    Hospital Course      Stephy Camejo is a 24 year old male admitted on 7/3/2025. He came to the ED for evaluation of severe left lower quadrant abdominal pain found to have rectus sheath hematoma likely from playing volleyball. Patient was admitted to watch for any further bleeding. Hemoglobin remained stable and pain decreased.     Acute left lower rectus sheath hematoma  - CT showed trace extraperitoneal blood products along the inner aspect of the lower abdominal wall; active bleeding is present within the muscle  - IR recommended hemoglobin check every 6 hours, no emergent intervention unless significant hemoglobin drop  - Supportive cares: Pain management  - Hemoglobin-Remained stable. Most recent hgb-14.8    Acute versus chronic kidney disease stage II  - Patient lift weights 4 times per week on average and is otherwise active  - Calculated BMI 29.05  - Creatinine 1.3 on 5/1/2025 when evaluated for acute gout  - UA to evaluate for " proteinuria-negative  - CK level-501-->525  - Avoid nephrotoxins including NSAIDs  - Outpatient follow-up with primary provider  - Creatinine-1.41-->1.39-->1.33    Gout  - PTA allopurinol    Consultations This Hospital Stay   None    Code Status   Full Code    Time Spent on this Encounter   I, Brandi Vallecillo NP, personally saw the patient today and spent greater than 30 minutes discharging this patient.       Brandi Vallecillo NP  [facility] EXTENDED RECOVERY AND SHORT STAY  [address] [place] [id number]  Phone: [phone]  Fax: [phone]  ______________________________________________________________________    Physical Exam   Vital Signs: Temp: 97.9  F (36.6  C) Temp src: Oral BP: 109/78 Pulse: 58   Resp: 18 SpO2: 98 % O2 Device: None (Room air)    Weight: 175 lbs 4.25 oz  Constitutional: awake, alert, cooperative, no apparent distress, and appears stated age  Respiratory: No increased work of breathing, good air exchange, clear to auscultation bilaterally, no crackles or wheezing  Cardiovascular: Normal apical impulse, regular rate and rhythm, normal S1 and S2, no S3 or S4, and no murmur noted  GI: No scars, normal bowel sounds, soft, non-distended, non-tender, no masses palpated, no hepatosplenomegally       Primary Care Physician   Dinh Ruiz    Discharge Orders      Reason for your hospital stay    Rectus sheath hematoma     Activity    Your activity upon discharge: activity as tolerated     Diet    Follow this diet upon discharge: Current Diet:Orders Placed This Encounter      Regular Diet Adult     Hospital Follow-up with Existing Primary Care Provider (PCP)            Significant Results and Procedures   Results for orders placed or performed during the hospital encounter of 07/03/25   CT Abdomen Pelvis w Contrast     Value    Radiologist flags Active extravasation from a vessel (AA)    Narrative    EXAM: CT ABDOMEN PELVIS W CONTRAST  LOCATION: [facility]  DATE: 7/3/2025    INDICATION: Lower abdominal pain.  COMPARISON: None.  TECHNIQUE: CT scan of the abdomen and pelvis was performed following injection of IV contrast. Multiplanar reformats were obtained. Dose reduction techniques were used.  CONTRAST: 90 ml Isovue 370.    FINDINGS:   LOWER CHEST: Normal.    HEPATOBILIARY: Normal.    PANCREAS: Normal.    SPLEEN: Normal.    ADRENAL GLANDS: Normal.    KIDNEYS/BLADDER: Normal.    BOWEL: Normal.    LYMPH NODES: Normal.    PELVIC ORGANS: Normal.    MUSCULOSKELETAL: Left lower rectus abdominis intramuscular hematoma with active internal bleeding. The involved segment of muscle measures roughly 8 x 5 x 4 cm, although this volume will represent a combination of both hematoma and muscle. Trace   extraperitoneal blood extension into the inner aspect of the lower abdominal wall. No hemoperitoneum.      Impression    IMPRESSION:   1.  Acute left - lower rectus sheath hematoma with trace extraperitoneal blood products along the inner aspect of the lower abdominal wall. The total size measures roughly 8 x 5 x 4 cm (however, this will include a portion of muscle and is not   exclusively the volume of blood).  2.  Active bleeding is present within the muscle at the time of imaging.      [Critical Result: Active extravasation from a vessel     Finding was identified on 7/3/2025 3:03 AM CDT.     Dr. Post was contacted by me on 7/3/2025 3:12 AM CDT and verbalized understanding of the critical result.       Discharge Medications      Review of your medicines        CONTINUE these medicines which have NOT CHANGED        Dose / Directions   allopurinol 100 MG tablet  Commonly known as: ZYLOPRIM  Used for: Chronic gout without tophus, unspecified cause, unspecified site      Dose: 100 mg  Take 1 tablet (100 mg) by mouth daily.  Quantity: 90 tablet  Refills: 0            Allergies   Allergies   Allergen Reactions    Naproxen Itching and Rash     Other  Reaction(s): Moderate pain (finding), Itching (finding), Eruption of skin (disorder)

## 2025-07-04 NOTE — PLAN OF CARE
"PRIMARY DIAGNOSIS: \"GENERIC\" NURSING  OUTPATIENT/OBSERVATION GOALS TO BE MET BEFORE DISCHARGE:  ADLs back to baseline: Yes    Activity and level of assistance: Ambulating independently.    Pain status: Improved-controlled with oral pain medications.    Return to near baseline physical activity: Yes     Discharge Planner Nurse   Safe discharge environment identified: Yes  Barriers to discharge: Yes       Entered by: Ghazal Gomes RN 07/04/2025 4:20 AM     Please review provider order for any additional goals.   Nurse to notify provider when observation goals have been met and patient is ready for discharge.Goal Outcome Evaluation:    Patient alert, oriented x 4. Complained of abdominal pain rating 2/10, aching, and constant in nature, declined administering pain medication. Denied any bleeding overnight. Noted karena but declined any cardiac symptoms. Will continue to monitor the patient at this time.        "

## 2025-07-04 NOTE — PROGRESS NOTES
Discharge orders noted and reviewed. No CM needs identified.     Ivett Michel RN   Acute Care Management  [facility]  For further questions/concerns you can reach us at Vocera Web Console

## 2025-07-04 NOTE — PLAN OF CARE
"PRIMARY DIAGNOSIS: \"GENERIC\" NURSING  OUTPATIENT/OBSERVATION GOALS TO BE MET BEFORE DISCHARGE:  ADLs back to baseline: Yes    Activity and level of assistance: Ambulating independently.    Pain status: Pain free.    Return to near baseline physical activity: No     Discharge Planner Nurse   Safe discharge environment identified: Yes  Barriers to discharge: Yes       Entered by: Blanca Lane RN 07/03/2025 10:25 PM     Please review provider order for any additional goals.   Nurse to notify provider when observation goals have been met and patient is ready for discharge.Goal Outcome Evaluation:                            "

## 2025-07-04 NOTE — PLAN OF CARE
Problem: Adult Inpatient Plan of Care  Goal: Absence of Hospital-Acquired Illness or Injury  Intervention: Identify and Manage Fall Risk  Recent Flowsheet Documentation  Taken 7/3/2025 1902 by Blanca Lane RN  Safety Promotion/Fall Prevention:   activity supervised   clutter free environment maintained   nonskid shoes/slippers when out of bed   room near nurse's station   safety round/check completed  Taken 7/3/2025 1648 by Blanca Lane RN  Safety Promotion/Fall Prevention:   activity supervised   clutter free environment maintained   nonskid shoes/slippers when out of bed   safety round/check completed  Intervention: Prevent Skin Injury  Recent Flowsheet Documentation  Taken 7/3/2025 1648 by Blanca Lane RN  Body Position: position changed independently  Intervention: Prevent Infection  Recent Flowsheet Documentation  Taken 7/3/2025 1902 by Blanca Lane RN  Infection Prevention:   hand hygiene promoted   rest/sleep promoted  Taken 7/3/2025 1648 by Blanca Lane RN  Infection Prevention:   hand hygiene promoted   cohorting utilized   Goal Outcome Evaluation:       Alert&O, On RA, denies pain, HR fluctuating b/w 50s to 45s, Provider notify, recent Hgb  14.7, Up independently to the bathroom.